# Patient Record
Sex: MALE | Race: WHITE | Employment: OTHER | URBAN - NONMETROPOLITAN AREA
[De-identification: names, ages, dates, MRNs, and addresses within clinical notes are randomized per-mention and may not be internally consistent; named-entity substitution may affect disease eponyms.]

---

## 2020-07-01 ENCOUNTER — HOSPITAL ENCOUNTER (EMERGENCY)
Age: 65
Discharge: HOME | End: 2020-07-01
Attending: EMERGENCY MEDICINE
Payer: MEDICARE

## 2020-07-01 ENCOUNTER — APPOINTMENT (OUTPATIENT)
Dept: GENERAL RADIOLOGY | Age: 65
End: 2020-07-01
Payer: MEDICARE

## 2020-07-01 VITALS
DIASTOLIC BLOOD PRESSURE: 83 MMHG | RESPIRATION RATE: 18 BRPM | TEMPERATURE: 98.4 F | OXYGEN SATURATION: 96 % | SYSTOLIC BLOOD PRESSURE: 145 MMHG

## 2020-07-01 PROCEDURE — 96372 THER/PROPH/DIAG INJ SC/IM: CPT

## 2020-07-01 PROCEDURE — 6360000002 HC RX W HCPCS: Performed by: EMERGENCY MEDICINE

## 2020-07-01 PROCEDURE — 73110 X-RAY EXAM OF WRIST: CPT

## 2020-07-01 PROCEDURE — 71101 X-RAY EXAM UNILAT RIBS/CHEST: CPT

## 2020-07-01 PROCEDURE — 99283 EMERGENCY DEPT VISIT LOW MDM: CPT

## 2020-07-01 PROCEDURE — 6370000000 HC RX 637 (ALT 250 FOR IP): Performed by: EMERGENCY MEDICINE

## 2020-07-01 RX ORDER — HYDROCODONE BITARTRATE AND ACETAMINOPHEN 5; 325 MG/1; MG/1
1 TABLET ORAL EVERY 6 HOURS PRN
Qty: 12 TABLET | Refills: 0 | Status: SHIPPED | OUTPATIENT
Start: 2020-07-01 | End: 2022-04-04

## 2020-07-01 RX ORDER — KETOROLAC TROMETHAMINE 15 MG/ML
30 INJECTION, SOLUTION INTRAMUSCULAR; INTRAVENOUS ONCE
Status: COMPLETED | OUTPATIENT
Start: 2020-07-01 | End: 2020-07-01

## 2020-07-01 RX ORDER — METHOCARBAMOL 500 MG/1
500 TABLET, FILM COATED ORAL 4 TIMES DAILY PRN
Qty: 28 TABLET | Refills: 0 | Status: SHIPPED | OUTPATIENT
Start: 2020-07-01 | End: 2022-04-04

## 2020-07-01 RX ADMIN — KETOROLAC TROMETHAMINE 30 MG: 15 INJECTION, SOLUTION INTRAMUSCULAR; INTRAVENOUS at 21:48

## 2020-07-01 ASSESSMENT — ENCOUNTER SYMPTOMS
SHORTNESS OF BREATH: 0
ABDOMINAL PAIN: 0
COLOR CHANGE: 0
VOMITING: 0
COUGH: 0
NAUSEA: 0

## 2020-07-01 ASSESSMENT — PAIN DESCRIPTION - FREQUENCY: FREQUENCY: CONTINUOUS

## 2020-07-01 ASSESSMENT — PAIN DESCRIPTION - PAIN TYPE
TYPE: ACUTE PAIN
TYPE: ACUTE PAIN

## 2020-07-01 ASSESSMENT — PAIN SCALES - GENERAL
PAINLEVEL_OUTOF10: 7
PAINLEVEL_OUTOF10: 5

## 2020-07-01 ASSESSMENT — PAIN DESCRIPTION - ORIENTATION: ORIENTATION: LEFT

## 2020-07-01 ASSESSMENT — PAIN DESCRIPTION - LOCATION: LOCATION: RIB CAGE;WRIST

## 2020-07-01 ASSESSMENT — PAIN DESCRIPTION - DESCRIPTORS: DESCRIPTORS: SHARP

## 2020-07-02 NOTE — ED PROVIDER NOTES
[facility]  eMERGENCY dEPARTMENT eNCOUnter      Pt Name: Michael Verdugo  MRN: [id number]  [place] 1955  Date of evaluation: 7/1/2020  Provider: Jillian [address]       Chief Complaint   Patient presents with    Wrist Pain     left wrist fell on arm,wrist area, rating a 7 on 0-10 scale    Rib Pain     fell onto left side         HISTORY OF PRESENT ILLNESS   (Location/Symptom, Timing/Onset, Context/Setting, Quality, Duration, Modifying Factors, Severity) Note limiting factors. HPI    Michael Verdugo is a 71 y.o. male who presents to the emergency department with complaint of left wrist and left rib pain. Patient reports that this evening approximately 2 hours ago he was standing next to a horse when it began to \"ren\". He states he began running away but tripped and fell onto his left side striking his left chest wall and his left wrist into the ground. He denies striking his head or any loss of consciousness. He denies any blood thinner use. He states he is noticed some swelling and pain in his left wrist and left chest and has concern for possible wrist or rib fracture and therefore comes in for evaluation. Nursing Notes were reviewed. REVIEW OF SYSTEMS    (2+ for level 4; 10+ for level 5)   Review of Systems   Constitutional: Negative for chills and fever. Eyes: Negative for visual disturbance. Respiratory: Negative for cough and shortness of breath. Cardiovascular: Negative for chest pain. Gastrointestinal: Negative for abdominal pain, nausea and vomiting. Musculoskeletal:        Positive left wrist and chest wall/rib pain   Skin: Negative for color change, rash and wound. Neurological: Negative for dizziness, syncope, light-headedness and headaches. Hematological: Does not bruise/bleed easily. All other systems reviewed and are negative.       PAST MEDICAL HISTORY     Past Medical History:   Diagnosis Date    Anxiety     Depression     GERD (gastroesophageal reflux disease)     Hemorrhoid     Hernia     Hyperlipidemia     Hypertension     Irritable bowel syndrome     Obesity        SURGICAL HISTORY       Past Surgical History:   Procedure Laterality Date    COLONOSCOPY  01/10/2017    -polyp,diverticulosis,lipoma    HERNIA REPAIR  4979    Umbilical repair    TONSILLECTOMY         CURRENT MEDICATIONS       Discharge Medication List as of 7/1/2020 10:18 PM      CONTINUE these medications which have NOT CHANGED    Details   bisoprolol (ZEBETA) 5 MG tablet Take 1 tablet by mouth daily, Disp-90 tablet, R-3Normal      lisinopril (PRINIVIL;ZESTRIL) 5 MG tablet Take 1 tablet by mouth daily, Disp-30 tablet, R-5Normal      glimepiride (AMARYL) 1 MG tablet Take 1 tablet by mouth every morning, Disp-90 tablet, R-3Normal      atorvastatin (LIPITOR) 80 MG tablet Take 1 tablet by mouth daily, Disp-90 tablet, R-3Normal      fluvoxaMINE (LUVOX) 100 MG tablet Historical Med      !! fluticasone (FLONASE) 50 MCG/ACT nasal spray 2 sprays by Each Nostril route daily, Disp-1 Bottle, R-0Normal      metFORMIN (GLUCOPHAGE) 1000 MG tablet Take 1 tablet by mouth 2 times daily (with meals), Disp-180 tablet, R-3Normal      !! fluticasone (FLONASE) 50 MCG/ACT nasal spray 1 spray by Each Nostril route 2 times daily, Disp-1 Bottle, R-0Normal      ibuprofen (ADVIL;MOTRIN) 600 MG tablet Take 1 tablet by mouth 3 times daily (with meals) for 7 days, Disp-20 tablet, R-0      aspirin 81 MG tablet Take 81 mg by mouth daily. !! - Potential duplicate medications found. Please discuss with provider. ALLERGIES     Patient has no known allergies.     FAMILY HISTORY       Family History   Problem Relation Age of Onset    Depression Mother     Diabetes Mother     High Blood Pressure Mother     Mental Illness Mother     Cancer Father     Diabetes Father     Asthma Brother     Cancer Brother     Diabetes Brother         SOCIAL HISTORY       Social History Socioeconomic History    Marital status:      Spouse name: None    Number of children: None    Years of education: None    Highest education level: None   Occupational History    None   Social Needs    Financial resource strain: Not hard at all   Napoleon-Vanesa insecurity     Worry: Never true     Inability: Never true   [email or web address] needs     Medical: No     Non-medical: No   Tobacco Use    Smoking status: Former Smoker     Packs/day: 2.00     Years: 10.00     Pack years: 20.00     Types: Cigarettes     Last attempt to quit: 1984     Years since quittin.8    Smokeless tobacco: Never Used   Substance and Sexual Activity    Alcohol use: Yes     Frequency: Monthly or less     Drinks per session: 1 or 2     Binge frequency: Less than monthly    Drug use: No    Sexual activity: None   Lifestyle    Physical activity     Days per week: 3 days     Minutes per session: 20 min    Stress: Rather much   Relationships    Social connections     Talks on phone: Twice a week     Gets together: Once a week     Attends [employer] service: More than 4 times per year     Active member of club or organization: Yes     Attends meetings of clubs or organizations: More than 4 times per year     Relationship status:     Intimate partner violence     Fear of current or ex partner: No     Emotionally abused: No     Physically abused: No     Forced sexual activity: No   Other Topics Concern    None   Social History Narrative    None       SCREENINGS    [place] Coma Scale  Eye Opening: Spontaneous  Best Verbal Response: Oriented  Best Motor Response: Obeys commands  [place] Coma Scale Score: 15      PHYSICAL EXAM    (up to 7 for level 4, 8 or more for level 5)   @[email or web address]    Physical Exam  Vitals signs and nursing note reviewed. Constitutional:       General: He is not in acute distress. Appearance: Normal appearance. He is normal weight. He is not ill-appearing, toxic-appearing or diaphoretic. HENT:      Head: Normocephalic and atraumatic. Eyes:      General: No scleral icterus. Right eye: No discharge. Left eye: No discharge. Extraocular Movements: Extraocular movements intact. Conjunctiva/sclera: Conjunctivae normal.      Pupils: Pupils are equal, round, and reactive to light. Neck:      Musculoskeletal: Normal range of motion and neck supple. No neck rigidity or muscular tenderness. Cardiovascular:      Rate and Rhythm: Normal rate and regular rhythm. Pulses: Normal pulses. Heart sounds: Normal heart sounds. No murmur. No friction rub. No gallop. Pulmonary:      Effort: Pulmonary effort is normal. No respiratory distress. Breath sounds: Normal breath sounds. No wheezing or rhonchi. Musculoskeletal:         General: Swelling and tenderness present. No deformity or signs of injury. Comments: Left upper extremity is neurovascularly intact; AIN/PIN are intact and normal.  Active range of motion is decreased secondary to pain. No ligamentous laxity noted. There is soft tissue swelling along the dorsal aspect of the left wrist from the midportion towards the ulna. There is mild diffuse pain with palpation. No obvious bony deformity or joint effusion. No pain in anatomical snuffbox    Patient also has pain in his left anterior lateral chest wall over rib regions 4-6 without bony deformity or crepitance. Skin:     General: Skin is warm and dry. Capillary Refill: Capillary refill takes less than 2 seconds. Comments: Soft tissue swelling to the left wrist as documented above but no signs of abrasions or ecchymosis   Neurological:      General: No focal deficit present. Mental Status: He is alert and oriented to person, place, and time. Cranial Nerves: No cranial nerve deficit. Psychiatric:         Mood and Affect: Mood normal.         Behavior: Behavior normal.         Thought Content:  Thought content normal.         Judgment: Judgment normal.         DIAGNOSTIC RESULTS     EKG (Per Emergency Physician):     RADIOLOGY (Per Emergency Physician): Interpretation per the Radiologist below, if available at the time of this note:  Xr Ribs Left Include Chest (min 3 Views)    Result Date: 7/1/2020  EXAMINATION: 6 XRAY VIEWS OF THE LEFT RIBS WITH FRONTAL XRAY VIEW OF THE CHEST 7/1/2020 9:36 pm COMPARISON: 12/18/2016 HISTORY: ORDERING SYSTEM PROVIDED HISTORY: injury TECHNOLOGIST PROVIDED HISTORY: injury FINDINGS: The lungs are clear there is no infiltrate, effusion or pneumothorax noted be present. The cardiac silhouette appears unremarkable. The ribs appear intact with no evidence for fracture. Old healed fractures noted. There is no evidence for underlying pleural reaction. No acute cardiopulmonary process seen and no evidence for acute rib fracture. Xr Wrist Left (min 3 Views)    Result Date: 7/1/2020  EXAMINATION: 3 XRAY VIEWS OF THE LEFT WRIST 7/1/2020 9:36 pm COMPARISON: None. HISTORY: ORDERING SYSTEM PROVIDED HISTORY: injury TECHNOLOGIST PROVIDED HISTORY: injury FINDINGS: The carpal bones all appear intact. There is no evidence for fracture dislocation. The soft tissues are somewhat edematous. The joint spaces are well preserved. There is no evidence for foreign body. No fracture or dislocation seen. Soft tissue swelling. RECOMMENDATION: If there is persistent pain and swelling, repeat imaging can be performed in 7-10 days. ED BEDSIDE ULTRASOUND:   Performed by ED Physician - none    LABS:  Labs Reviewed - No data to display     All other labs were within normal range or not returned as of this dictation.     EMERGENCY DEPARTMENT COURSE and DIFFERENTIAL DIAGNOSIS/MDM:   Vitals:    Vitals:    07/01/20 2100   BP: (!) 145/83   Resp: 18   Temp: 98.4 °F (36.9 °C)   SpO2: 96%       Medications   hydrocodone-acetaminophen (NORCO) tablet 5-325 mg (STARTER PACK) ( Oral Not Given 7/1/20 2237)   ketorolac (TORADOL) injection 30 mg (30 mg Intramuscular Given 7/1/20 4640)       MDM. Patient had a mechanical injury so I felt no need for a cardiac or syncope work-up. Moreover he did not strike his head or have loss of consciousness or blood thinner use. X-rays were obtained of the left wrist and chest wall/ribs concerning for fracture. X-rays are negative indicating patient has a left rib contusion and left wrist sprain. Therefore at this time he can be placed in a wrist brace to speed healing and discharged home with pain medication. REVAL:         CRITICAL CARE TIME   Total Critical Care time was minutes, excluding separately reportable procedures. There was a high probability of clinically significant/life threatening deterioration in the patient's condition which required my urgent intervention. CONSULTS:  None    PROCEDURES:  Unless otherwise noted below, none     Procedures    FINAL IMPRESSION      1. Rib contusion, left, initial encounter    2. Left wrist sprain, initial encounter          DISPOSITION/PLAN   DISPOSITION Decision To Discharge 07/01/2020 10:16:56 PM      PATIENT REFERRED TO:  Stephanie French MD  [place] [address], [address]  [phone]    Schedule an appointment as soon as possible for a visit in 1 week  If symptoms worsen      DISCHARGE MEDICATIONS:  Discharge Medication List as of 7/1/2020 10:18 PM      START taking these medications    Details   HYDROcodone-acetaminophen (NORCO) 5-325 MG per tablet Take 1 tablet by mouth every 6 hours as needed for Pain for up to 3 days. Intended supply: 3 days.  Take lowest dose possible to manage pain, Disp-12 tablet, R-0Print      methocarbamol (ROBAXIN) 500 MG tablet Take 1 tablet by mouth 4 times daily as needed (Muscle pain/spasm), Disp-28 tablet, R-0Print                (Please note:  Portions of this note were completed with a voice recognition program.  Efforts were made to edit the dictations but occasionally words and phrases are mis-transcribed.)  Form v2016. [id number]    Maru Gonzalez DO (electronically signed)  Emergency Medicine Provider       DO Jaz  07/01/20 2595

## 2022-05-16 ENCOUNTER — TELEPHONE (OUTPATIENT)
Dept: SURGERY | Age: 67
End: 2022-05-16

## 2022-05-23 ENCOUNTER — HOSPITAL ENCOUNTER (OUTPATIENT)
Dept: ULTRASOUND IMAGING | Age: 67
Discharge: HOME | End: 2022-05-25
Payer: MEDICARE

## 2022-05-23 DIAGNOSIS — Z13.6 SCREENING FOR AAA (ABDOMINAL AORTIC ANEURYSM): ICD-10-CM

## 2022-05-23 PROCEDURE — 76706 US ABDL AORTA SCREEN AAA: CPT

## 2022-07-05 ENCOUNTER — OFFICE VISIT (OUTPATIENT)
Dept: GASTROENTEROLOGY | Age: 67
End: 2022-07-05
Payer: MEDICARE

## 2022-07-05 ENCOUNTER — TELEPHONE (OUTPATIENT)
Dept: GASTROENTEROLOGY | Age: 67
End: 2022-07-05

## 2022-07-05 VITALS
HEART RATE: 61 BPM | RESPIRATION RATE: 16 BRPM | BODY MASS INDEX: 28.89 KG/M2 | HEIGHT: 75 IN | TEMPERATURE: 97.9 F | DIASTOLIC BLOOD PRESSURE: 81 MMHG | WEIGHT: 232.4 LBS | SYSTOLIC BLOOD PRESSURE: 132 MMHG

## 2022-07-05 DIAGNOSIS — Z01.818 PRE-OP TESTING: ICD-10-CM

## 2022-07-05 DIAGNOSIS — Z12.11 SCREENING FOR COLON CANCER: Primary | ICD-10-CM

## 2022-07-05 DIAGNOSIS — K62.5 RECTAL BLEEDING: ICD-10-CM

## 2022-07-05 PROCEDURE — 99213 OFFICE O/P EST LOW 20 MIN: CPT

## 2022-07-05 PROCEDURE — 99202 OFFICE O/P NEW SF 15 MIN: CPT | Performed by: INTERNAL MEDICINE

## 2022-07-05 PROCEDURE — 1123F ACP DISCUSS/DSCN MKR DOCD: CPT | Performed by: INTERNAL MEDICINE

## 2022-07-05 RX ORDER — POLYETHYLENE GLYCOL 3350, SODIUM CHLORIDE, POTASSIUM CHLORIDE, SODIUM BICARBONATE, AND SODIUM SULFATE 240; 5.84; 2.98; 6.72; 22.72 G/4L; G/4L; G/4L; G/4L; G/4L
4000 POWDER, FOR SOLUTION ORAL ONCE
Qty: 4000 ML | Refills: 0 | Status: SHIPPED | OUTPATIENT
Start: 2022-07-05 | End: 2022-07-05

## 2022-07-05 ASSESSMENT — ENCOUNTER SYMPTOMS
EYES NEGATIVE: 1
RESPIRATORY NEGATIVE: 1

## 2022-07-05 NOTE — PATIENT INSTRUCTIONS
SURVEY:    You may be receiving a survey from [employer] regarding your visit today. Please complete the survey to enable us to provide the highest quality of care to you and your family. If you cannot score us a very good on any question, please call the office to discuss how we could have made your experience a very good one. Thank you.

## 2022-07-05 NOTE — TELEPHONE ENCOUNTER
Patient saw in office today colonoscopy scheduled for 7/26/22 prep instructions handed to patient , surgery scheduling form faxed to surgery patient was informed of getting EKG done

## 2022-07-05 NOTE — PROGRESS NOTES
Chief Complaint   Patient presents with    New Patient     Screening for colon cancer; pt states has IBS         HPI  Mr. Ansley Rivas is a  78year old man with a history of depression, acid reflux, hypertension, hemorrhoids who presents for a colon cancer screening. He reports that he had a sigmoidoscopy in 1999 and that the 2017 procedure was his first full colonoscopy. He denies any dysphagia. He endorses some rectal bleeding in the past 6 weeks which led to his PCP recommended that he gets his colonoscopy performed with GI. He denies any weight loss. He denies any family history of colon cancer. He endorses a history of irritable bowel symptom, endorses occasional bloating with reflux - adding that he takes Prevacid occasionally. He reports that he had an EGD in 2013 and that there were no concerning findings. Colonoscopy 01/11/2017   Pan-colonic diverticulosis. Descending colon lipoma  Recto-sigmoid polyp. Pathology:   RECTAL SIGMOID POLYPECTOMY:   -MILDLY HYPERPLASTIC COLONIC MUCOSA (EARLY HYPERPLASTIC POLYP).      Past Medical History:   Diagnosis Date    Anxiety     COVID-19 virus infection 2021    Depression     GERD (gastroesophageal reflux disease)     Hemorrhoid     Hernia     Hyperlipidemia     Hypertension     Irritable bowel syndrome     Obesity          Past Surgical History:   Procedure Laterality Date    COLONOSCOPY  2017    -polyp,diverticulosis,lipoma    HERNIA REPAIR  7241    Umbilical repair    TONSILLECTOMY           Current Outpatient Medications   Medication Sig Dispense Refill    lisinopril (PRINIVIL;ZESTRIL) 5 MG tablet TAKE 1 TABLET EVERY DAY 90 tablet 1    atorvastatin (LIPITOR) 80 MG tablet Take 1 tablet by mouth daily 90 tablet 3    metFORMIN (GLUCOPHAGE) 1000 MG tablet Take 1 tablet by mouth 2 times daily (with meals) 180 tablet 3    glimepiride (AMARYL) 1 MG tablet Take 1 tablet by mouth every morning (before breakfast) 90 tablet 3    bisoprolol (ZEBETA) 5 MG tablet TAKE 1 TABLET EVERY DAY 90 tablet 3    fluvoxaMINE (LUVOX) 100 MG tablet 100 mg daily       aspirin 81 MG tablet Take 81 mg by mouth daily.  azelastine (ASTELIN) 0.1 % nasal spray 1 spray by Nasal route 2 times daily For 10 days only (Patient not taking: Reported on 7/5/2022) 30 mL 0    fluticasone (FLONASE) 50 MCG/ACT nasal spray 2 sprays by Each Nostril route daily (Patient not taking: Reported on 7/5/2022) 1 Bottle 0     No current facility-administered medications for this visit. Social Determinants of Health     Tobacco Use: Medium Risk    Smoking Tobacco Use: Former Smoker    Smokeless Tobacco Use: Never Used   Alcohol Use: Heavy Drinker    Frequency of Alcohol Consumption: 2-3 times a week    Average Number of Drinks: 3 or 4    Frequency of Binge Drinking: Monthly   Financial Resource Strain: Low Risk     Difficulty of Paying Living Expenses: Not hard at all   Food Insecurity:     Worried About Running Out of Food in the Last Year: Not on file    [place] of Food in the Last Year: Not on file   Transportation Needs: No Transportation Needs    Lack of Transportation (Medical): No    Lack of Transportation (Non-Medical): No   Physical Activity: Insufficiently Active    Days of Exercise per Week: 3 days    Minutes of Exercise per Session: 20 min   Stress: No Stress Concern Present    Feeling of Stress : Only a little   Social Connections: Moderately Integrated    Frequency of Communication with Friends and Family: Once a week    Frequency of Social Gatherings with Friends and Family: Once a week    Attends Anabaptist Services: More than 4 times per year    Active Member of [address] [place] [address] [employer] or Organizations:  Yes    Attends Club or Organization Meetings: 1 to 4 times per year    Marital Status:    Intimate Partner Violence: Not At Risk    Fear of Current or Ex-Partner: No    Emotionally Abused: No    Physically Abused: No    Sexually Abused: No   Depression: Not at risk  PHQ-2 Score: 0   Housing Stability:     Unable to Pay for Housing in the Last Year: Not on file    Number of Places Lived in the Last Year: Not on file    Unstable Housing in the Last Year: Not on file         /81 (Site: Left Upper Arm, Position: Sitting, Cuff Size: Medium Adult)   Pulse 61   Temp 97.9 °F (36.6 °C) (Temporal)   Resp 16   Ht 6' 3\" (1.905 m)   Wt 232 lb 6.4 oz (105.4 kg)   BMI 29.05 kg/m²     Physical Exam  Constitutional:       Appearance: Normal appearance. He is normal weight. HENT:      Head: Normocephalic. Nose: Nose normal.      Mouth/Throat:      Mouth: Mucous membranes are moist.      Pharynx: Oropharynx is clear. Comments: Mallampati score 2  Eyes:      General: No scleral icterus. Extraocular Movements: Extraocular movements intact. Pupils: Pupils are equal, round, and reactive to light. Cardiovascular:      Rate and Rhythm: Normal rate. Pulses: Normal pulses. Pulmonary:      Effort: Pulmonary effort is normal.      Breath sounds: Normal breath sounds. Abdominal:      General: Abdomen is flat. Bowel sounds are normal. There is no distension. Musculoskeletal:         General: Normal range of motion. Cervical back: Normal range of motion. Skin:     General: Skin is warm. Neurological:      General: No focal deficit present. Mental Status: He is alert. Psychiatric:         Mood and Affect: Mood normal.       Review of Systems   Constitutional: Negative. HENT: Negative. Eyes: Negative. Respiratory: Negative. Cardiovascular: Negative. Gastrointestinal:        IBS  GERD  Hemorrhoids   Genitourinary: Negative. Musculoskeletal: Negative. Skin: Negative. Neurological: Negative. Hematological: Negative. Psychiatric/Behavioral: The patient is nervous/anxious. Assessment   Mr. Tosha Ribera is a  78year old man with a history of depression, acid reflux, hypertension, hemorrhoids who presents for a colon cancer screening after he complained of a recurrence of rectal bleeding to his PCP. He is of average risk, Mallampati score 2, ASA 2. PLAN:  1. Screening for colon cancer  - COLONOSCOPY (Screening); Future  - polyethylene glycol-electrolytes (COLYTE) 240 g SOLR solution; Take 4,000 mLs by mouth once for 1 dose  Dispense: 4000 mL; Refill: 0    2. Rectal bleeding  - COLONOSCOPY (Screening); Future  - polyethylene glycol-electrolytes (COLYTE) 240 g SOLR solution; Take 4,000 mLs by mouth once for 1 dose  Dispense: 4000 mL; Refill: 0    3. Follow-up in 4-6 weeks or sooner as needed based on findings           Informed consent was obtained with a discussion about potential risks and complications of the procedure. Patient verbalized understanding and willingness to continue with the procedure scheduling. Spent 25 minutes with the patient with greater than 50 percent of the time was spent on face-to-face time in discussion with the patient regarding diagnostic options/results, treatment options, counseling, and follow-up plan.       Arielle Parker MD [facility]

## 2022-07-15 ENCOUNTER — HOSPITAL ENCOUNTER (OUTPATIENT)
Age: 67
Discharge: HOME | End: 2022-07-15
Payer: MEDICARE

## 2022-07-15 DIAGNOSIS — Z01.818 PRE-OP TESTING: ICD-10-CM

## 2022-07-15 DIAGNOSIS — Z12.11 SCREENING FOR COLON CANCER: ICD-10-CM

## 2022-07-15 PROBLEM — J01.90 ACUTE BACTERIAL SINUSITIS: Status: ACTIVE | Noted: 2022-07-15

## 2022-07-15 PROBLEM — B96.89 ACUTE BACTERIAL SINUSITIS: Status: ACTIVE | Noted: 2022-07-15

## 2022-07-15 PROCEDURE — 93005 ELECTROCARDIOGRAM TRACING: CPT

## 2022-07-16 LAB
EKG ATRIAL RATE: 67 BPM
EKG P AXIS: 5 DEGREES
EKG P-R INTERVAL: 198 MS
EKG Q-T INTERVAL: 422 MS
EKG QRS DURATION: 102 MS
EKG QTC CALCULATION (BAZETT): 445 MS
EKG R AXIS: -44 DEGREES
EKG T AXIS: 22 DEGREES
EKG VENTRICULAR RATE: 67 BPM

## 2022-07-16 PROCEDURE — 93010 ELECTROCARDIOGRAM REPORT: CPT | Performed by: FAMILY MEDICINE

## 2022-09-01 ENCOUNTER — ANESTHESIA EVENT (OUTPATIENT)
Dept: OPERATING ROOM | Age: 67
End: 2022-09-01
Payer: MEDICARE

## 2022-09-07 ENCOUNTER — ANESTHESIA (OUTPATIENT)
Dept: OPERATING ROOM | Age: 67
End: 2022-09-07
Payer: MEDICARE

## 2022-09-07 ENCOUNTER — HOSPITAL ENCOUNTER (OUTPATIENT)
Age: 67
Setting detail: OUTPATIENT SURGERY
Discharge: HOME | End: 2022-09-07
Attending: INTERNAL MEDICINE | Admitting: INTERNAL MEDICINE
Payer: MEDICARE

## 2022-09-07 VITALS
OXYGEN SATURATION: 95 % | TEMPERATURE: 97.8 F | SYSTOLIC BLOOD PRESSURE: 142 MMHG | HEIGHT: 75 IN | DIASTOLIC BLOOD PRESSURE: 74 MMHG | BODY MASS INDEX: 28.35 KG/M2 | WEIGHT: 228 LBS | HEART RATE: 60 BPM | RESPIRATION RATE: 16 BRPM

## 2022-09-07 DIAGNOSIS — K62.5 RECTAL BLEEDING: ICD-10-CM

## 2022-09-07 PROCEDURE — 7100000011 HC PHASE II RECOVERY - ADDTL 15 MIN: Performed by: INTERNAL MEDICINE

## 2022-09-07 PROCEDURE — 3700000000 HC ANESTHESIA ATTENDED CARE: Performed by: INTERNAL MEDICINE

## 2022-09-07 PROCEDURE — 2580000003 HC RX 258: Performed by: NURSE ANESTHETIST, CERTIFIED REGISTERED

## 2022-09-07 PROCEDURE — 45385 COLONOSCOPY W/LESION REMOVAL: CPT | Performed by: INTERNAL MEDICINE

## 2022-09-07 PROCEDURE — 6360000002 HC RX W HCPCS: Performed by: NURSE ANESTHETIST, CERTIFIED REGISTERED

## 2022-09-07 PROCEDURE — 45380 COLONOSCOPY AND BIOPSY: CPT | Performed by: INTERNAL MEDICINE

## 2022-09-07 PROCEDURE — 7100000010 HC PHASE II RECOVERY - FIRST 15 MIN: Performed by: INTERNAL MEDICINE

## 2022-09-07 PROCEDURE — 3700000001 HC ADD 15 MINUTES (ANESTHESIA): Performed by: INTERNAL MEDICINE

## 2022-09-07 PROCEDURE — 3609010600 HC COLONOSCOPY POLYPECTOMY SNARE/COLD BIOPSY: Performed by: INTERNAL MEDICINE

## 2022-09-07 PROCEDURE — 88305 TISSUE EXAM BY PATHOLOGIST: CPT

## 2022-09-07 PROCEDURE — 2709999900 HC NON-CHARGEABLE SUPPLY: Performed by: INTERNAL MEDICINE

## 2022-09-07 PROCEDURE — 2500000003 HC RX 250 WO HCPCS: Performed by: NURSE ANESTHETIST, CERTIFIED REGISTERED

## 2022-09-07 RX ORDER — SODIUM CHLORIDE, SODIUM LACTATE, POTASSIUM CHLORIDE, CALCIUM CHLORIDE 600; 310; 30; 20 MG/100ML; MG/100ML; MG/100ML; MG/100ML
INJECTION, SOLUTION INTRAVENOUS CONTINUOUS
Status: DISCONTINUED | OUTPATIENT
Start: 2022-09-07 | End: 2022-09-07 | Stop reason: HOSPADM

## 2022-09-07 RX ORDER — OXYMETAZOLINE HYDROCHLORIDE 0.05 G/100ML
1 SPRAY NASAL 2 TIMES DAILY
COMMUNITY

## 2022-09-07 RX ORDER — PROPOFOL 10 MG/ML
INJECTION, EMULSION INTRAVENOUS CONTINUOUS PRN
Status: DISCONTINUED | OUTPATIENT
Start: 2022-09-07 | End: 2022-09-07 | Stop reason: SDUPTHER

## 2022-09-07 RX ADMIN — LIDOCAINE HYDROCHLORIDE 60 MG: 20 INJECTION, SOLUTION EPIDURAL; INFILTRATION; INTRACAUDAL at 08:23

## 2022-09-07 RX ADMIN — SODIUM CHLORIDE, POTASSIUM CHLORIDE, SODIUM LACTATE AND CALCIUM CHLORIDE: 600; 310; 30; 20 INJECTION, SOLUTION INTRAVENOUS at 07:45

## 2022-09-07 RX ADMIN — PROPOFOL 80 MG: 10 INJECTION, EMULSION INTRAVENOUS at 08:23

## 2022-09-07 ASSESSMENT — PAIN SCALES - GENERAL: PAINLEVEL_OUTOF10: 0

## 2022-09-07 NOTE — H&P
History and Physical    Patient's Name/Date of Birth: Albertoisadora Null / 1955 (86 y.o.)    MRN: [id number]     Date: September 7, 2022       CHIEF COMPLAINT:  screening for colon cancer     HPI  Mr. Tosha Ribera is a  78year old man with a history of depression, acid reflux, hypertension, hemorrhoids who presents for a colon cancer screening. He reports that he had a sigmoidoscopy in 1999 and that the 2017 procedure was his first full colonoscopy. He denies any dysphagia. He endorses some rectal bleeding in the past 6 weeks which led to his PCP recommended that he gets his colonoscopy performed with GI. He denies any weight loss. He denies any family history of colon cancer. He endorses a history of irritable bowel symptom, endorses occasional bloating with reflux - adding that he takes Prevacid occasionally. He reports that he had an EGD in 2013 and that there were no concerning findings. Colonoscopy 01/11/2017   Pan-colonic diverticulosis. Descending colon lipoma  Recto-sigmoid polyp. Pathology:   RECTAL SIGMOID POLYPECTOMY:   -MILDLY HYPERPLASTIC COLONIC MUCOSA (EARLY HYPERPLASTIC POLYP). Past Medical History:   Diagnosis Date    Anxiety     COVID-19 virus infection 2021    Depression     GERD (gastroesophageal reflux disease)     Hemorrhoid     Hernia     Hyperlipidemia     Hypertension     Irritable bowel syndrome     Obesity      Past Surgical History:   Procedure Laterality Date    COLONOSCOPY  2017    -polyp,diverticulosis,lipoma    HERNIA REPAIR  1162    Umbilical repair    TONSILLECTOMY       Current Facility-Administered Medications   Medication Dose Route Frequency Provider Last Rate Last Admin    lactated ringers infusion   IntraVENous Continuous Ally Clementsiliisadora Null, APRN - CRNA         No Known Allergies  Family History   Problem Relation Age of Onset    Depression Mother     Diabetes Mother     High Blood Pressure Mother     Mental Illness Mother     Cancer Father     Diabetes Father Other Father     Asthma Brother     Cancer Brother     Diabetes Brother      Social History     Socioeconomic History    Marital status:      Spouse name: Not on file    Number of children: Not on file    Years of education: Not on file    Highest education level: Not on file   Occupational History    Not on file   Tobacco Use    Smoking status: Former     Packs/day: 2.00     Years: 10.00     Pack years: 20.00     Types: Cigarettes     Quit date: 1984     Years since quittin.0    Smokeless tobacco: Never   Vaping Use    Vaping Use: Never used   Substance and Sexual Activity    Alcohol use: Yes    Drug use: No    Sexual activity: Not on file   Other Topics Concern    Not on file   Social History Narrative    Not on file     Social Determinants of Health     Financial Resource Strain: Low Risk     Difficulty of Paying Living Expenses: Not hard at all   Food Insecurity: No Food Insecurity    Worried About [address] [employer] in the Last Year: Never true    [facility] in the Last Year: Never true   Transportation Needs: No Transportation Needs    Lack of Transportation (Medical): No    Lack of Transportation (Non-Medical): No   Physical Activity: Insufficiently Active    Days of Exercise per Week: 3 days    Minutes of Exercise per Session: 20 min   Stress: No Stress Concern Present    Feeling of Stress : Only a little   Social Connections:  Moderately Integrated    Frequency of Communication with Friends and Family: Once a week    Frequency of Social Gatherings with Friends and Family: Once a week    Attends Restorationism Services: More than 4 times per year    Active Member of [address] [place] [address] or Organizations: Yes    Attends Club or Organization Meetings: 1 to 4 times per year    Marital Status:    Intimate Partner Violence: Not At Risk    Fear of Current or Ex-Partner: No    Emotionally Abused: No    Physically Abused: No    Sexually Abused: No   Housing Stability: Not on file     ROS:

## 2022-09-07 NOTE — ANESTHESIA PRE PROCEDURE
Department of Anesthesiology  Preprocedure Note       Name:  Mandy Fonseca   Age:  78 y.o.  :  1955                                          MRN:  [id number]         Date:  2022      Surgeon: Faraz Richard):  Nisreen Ferrer MD    Procedure: Procedure(s):  COLORECTAL CANCER SCREENING, NOT HIGH RISK    Medications prior to admission:   Prior to Admission medications    Medication Sig Start Date End Date Taking? Authorizing Provider   oxymetazoline (AFRIN) 0.05 % nasal spray 1 spray by Nasal route 2 times daily   Yes Historical Provider, MD   glimepiride (AMARYL) 2 MG tablet Take 1 tablet by mouth in the morning and 1 tablet before bedtime. 8/10/22   Latesha White MD   lisinopril (PRINIVIL;ZESTRIL) 5 MG tablet TAKE 1 TABLET EVERY DAY 22   Latesha White MD   atorvastatin (LIPITOR) 80 MG tablet Take 1 tablet by mouth daily 1/3/22   Latesha White MD   metFORMIN (GLUCOPHAGE) 1000 MG tablet Take 1 tablet by mouth 2 times daily (with meals) 1/3/22   Latesha White MD   bisoprolol (ZEBETA) 5 MG tablet TAKE 1 TABLET EVERY DAY 21   Kaire Mcgraw, APRN - CNP   fluvoxaMINE (LUVOX) 100 MG tablet 100 mg daily  19   Historical Provider, MD   aspirin 81 MG tablet Take 81 mg by mouth daily. Historical Provider, MD       Current medications:    Current Facility-Administered Medications   Medication Dose Route Frequency Provider Last Rate Last Admin    lactated ringers infusion   IntraVENous Continuous Ally [place] Banister - CRNA 100 mL/hr at 22 0745 New Bag at 22 0745       Allergies:  No Known Allergies    Problem List:    Patient Active Problem List   Diagnosis Code    Type 2 diabetes mellitus without complication, without long-term current use of insulin ([facility] [address] E11.9    Mixed hyperlipidemia E78.2    Essential hypertension, benign I10    Colon polyp K63.5    Diverticulosis of large intestine without hemorrhage K57.30    Acute bacterial sinusitis J01.90, B96.89 Past Medical History:        Diagnosis Date    Anxiety     COVID-19 virus infection     Depression     GERD (gastroesophageal reflux disease)     Hemorrhoid     Hernia     Hyperlipidemia     Hypertension     Irritable bowel syndrome     Obesity        Past Surgical History:        Procedure Laterality Date    COLONOSCOPY  2017    -polyp,diverticulosis,lipoma    HERNIA REPAIR  08    Umbilical repair    TONSILLECTOMY         Social History:    Social History     Tobacco Use    Smoking status: Former     Packs/day: 2.00     Years: 10.00     Pack years: 20.00     Types: Cigarettes     Quit date: 1984     Years since quittin.0    Smokeless tobacco: Never   Substance Use Topics    Alcohol use: Yes                                Counseling given: Not Answered      Vital Signs (Current):   Vitals:    22 1519 22 0730   BP:  (!) 169/97   Pulse:  73   Resp:  23   Temp:  36.7 °C (98.1 °F)   TempSrc:  Temporal   SpO2:  98%   Weight: 232 lb (105.2 kg) 228 lb (103.4 kg)   Height: 6' 3\" (1.905 m) 6' 3\" (1.905 m)                                              BP Readings from Last 3 Encounters:   22 (!) 169/97   07/15/22 128/70   22 132/81       NPO Status: Time of last liquid consumption: 230                        Time of last solid consumption:                         Date of last liquid consumption: 22                        Date of last solid food consumption: 22    BMI:   Wt Readings from Last 3 Encounters:   22 228 lb (103.4 kg)   07/15/22 232 lb (105.2 kg)   22 232 lb 6.4 oz (105.4 kg)     Body mass index is 28.5 kg/m².     CBC: No results found for: WBC, RBC, HGB, HCT, MCV, RDW, PLT    CMP:   Lab Results   Component Value Date/Time     2021 09:47 AM    K 4.4 2021 09:47 AM     2021 09:47 AM    CO2 29 2021 09:47 AM    BUN 12 2021 09:47 AM    CREATININE 0.91 2021 09:47 AM    GFRAA >60 03/23/2015 01:01 PM    LABGLOM >60 03/23/2015 01:01 PM    GLUCOSE 108 09/23/2021 09:47 AM    PROT 7.3 03/23/2015 01:01 PM    CALCIUM 9.5 09/23/2021 09:47 AM    BILITOT 0.65 03/23/2015 01:01 PM    ALKPHOS 71 03/23/2015 01:01 PM    AST 18 03/23/2015 01:01 PM    ALT 27 03/23/2015 01:01 PM       POC Tests: No results for input(s): POCGLU, POCNA, POCK, POCCL, POCBUN, POCHEMO, POCHCT in the last 72 hours. Coags: No results found for: PROTIME, INR, APTT    HCG (If Applicable): No results found for: PREGTESTUR, PREGSERUM, HCG, HCGQUANT     ABGs: No results found for: PHART, PO2ART, UBY7RYO, QSP0FVV, BEART, K1HHGNHV     Type & Screen (If Applicable):  No results found for: LABABO, LABRH    Drug/Infectious Status (If Applicable):  Lab Results   Component Value Date/Time    HEPCAB NEGATIVE 12/12/2017 07:35 AM       COVID-19 Screening (If Applicable):   Lab Results   Component Value Date/Time    COVID19 Negative 03/29/2021 09:30 AM           Anesthesia Evaluation   no history of anesthetic complications:   Airway: Mallampati: IV  TM distance: >3 FB   Neck ROM: full  Mouth opening: > = 3 FB   Dental: normal exam         Pulmonary:Negative Pulmonary ROS and normal exam  breath sounds clear to auscultation                            ROS comment: Chronic sinusitis   Cardiovascular:  Exercise tolerance: good (>4 METS),   (+) hypertension:, hyperlipidemia      ECG reviewed                        Neuro/Psych:   (+) psychiatric history:            GI/Hepatic/Renal:   (+) GERD: well controlled, bowel prep,           Endo/Other:    (+) Diabetes (A1C 8.8)Type II DM, poorly controlled, , .                 Abdominal:             Vascular: negative vascular ROS. Other Findings:           Anesthesia Plan      general and TIVA     ASA 3       Induction: intravenous. Anesthetic plan and risks discussed with patient.                         IRMA Norton - LEIGH   9/7/2022

## 2022-09-07 NOTE — PROGRESS NOTES

## 2022-09-07 NOTE — ANESTHESIA POSTPROCEDURE EVALUATION
Department of Anesthesiology  Postprocedure Note    Patient: Mackenzie Monge  MRN: [id number]  YOB: 1955  Date of evaluation: 9/7/2022      Procedure Summary     Date: 09/07/22 Room / Location: [address] [place] [address]    Anesthesia Start: Katy Byrd Anesthesia Stop: 1491    Procedure: COLONOSCOPY POLYPECTOMY SNARE/COLD BIOPSY Diagnosis:       Rectal bleeding      (RECTAL BLEEDING)    Surgeons: Maple Kanner, MD Responsible Provider: [place]. Mackenzie MongeIRMA - CRNA    Anesthesia Type: general, TIVA ASA Status: 3          Anesthesia Type: No value filed.     Simona Phase I: Simona Score: 10    Simona Phase II: Simona Score: 10      Anesthesia Post Evaluation    Patient location during evaluation: PACU  Patient participation: complete - patient participated  Level of consciousness: awake and alert  Pain score: 0  Airway patency: patent  Nausea & Vomiting: no vomiting and no nausea  Complications: no  Cardiovascular status: blood pressure returned to baseline  Respiratory status: acceptable  Hydration status: stable

## 2022-09-07 NOTE — OP NOTE
[facility]    COLONOSCOPY    PROCEDURE DATE: 09/07/22    REFERRING PHYSICIAN: No ref. provider found     PRIMARY CARE PROVIDER: Maria Fernanda Padilla MD    ATTENDING PHYSICIAN: Cynthia Cruz MD     HISTORY: Mr. Gladis Colon is a 78 y.o. male who presents to the  endoscopy unit for colonoscopy. The patient's clinical history is remarkable for diabetes mellitus II, anxiety, hypertension. He is currently medically stable and appropriate for the planned procedure. PREOPERATIVE DIAGNOSIS: screening for colon cancer. PROCEDURES:   Transanal Colonoscopy --screening    POSTPROCEDURE DIAGNOSIS:  Colon polyps  Diverticulosis    MEDICATIONS: MAC per anesthesia     EBL: 2ml      INSTRUMENT: Olympus [id number] Pediatric flexible Colonoscope. PREPARATION: The nature and character of the procedure as well as risks, benefits, and alternatives were discussed with the patient and informed consent was obtained. Complications were said to include, but were not limited to: medication allergy, medication reaction, cardiovascular and respiratory problems, bleeding, perforation, infection, and/or missed diagnosis. Following arrival in the endoscopy room, the patient was placed in the left lateral decubitus position and final time-out accomplished in the presence of the nursing staff. Baseline vital signs were obtained and reviewed, and IV sedation was subsequently initiated. FINDINGS: Rectal examination demonstrated no significant visible external abnormality and digital palpation was unremarkable. Following adequate conscious sedation the colonoscope was introduced and advanced under direct visualization to the terminal ileum. The bowel preparation was felt to be fair - improved with irrigation and suction of fecal debris. Cecal intubation time was 6 minutes. Once maximally inserted, the endoscope was withdrawn and the mucosa was carefully inspected.  The mucosal exam revealed severe sigmoid diverticulosis and moderate pan colon diverticulosis. Sessile polyps removed:  5mm splenic flexure sessile polyp removed with cold biopsy forceps, 7mm sessile polyp removed from sigmoid colon with a cold snare. 1.4cm sessile polyp removed from rectum with a hot snare. Retroflexion was performed in the rectum and mild internal hemorrhoids were noted. Withdrawal time was 16 minutes. IMPRESSION:   Colon polyps  Diverticulosis  Tortuous colon    RECOMMENDATIONS:   1) Follow up with referring provider, as previously scheduled. 2) Repeat Colonoscopy in no later than 3 years  3) Await pathology report       Electronically signed by Stephany Eaton MD on 9/7/2022 at 8:54 AM     The patient was counseled at length about the risks of etelvina Covid-19 during their perioperative period and any recovery window from their procedure. The patient was made aware that etelvina Covid-19  may worsen their prognosis for recovering from their procedure  and lend to a higher morbidity and/or mortality risk. All material risks, benefits, and reasonable alternatives including postponing the procedure were discussed. The patient DOES wish to proceed with the procedure at this time.

## 2022-09-08 LAB — SURGICAL PATHOLOGY REPORT: NORMAL

## (undated) DEVICE — FORCEPS BX L240CM JAW DIA2.4MM ORNG L CAP W/ NDL DISP RAD

## (undated) DEVICE — ACUSNARE POLYPECTOMY SNARE: Brand: ACUSNARE

## (undated) DEVICE — CANNULA ORAL NSL AD CO2 N INTUB O2 DEL DISP TRU LNK

## (undated) DEVICE — TUBING SUCT NON-STRL 9/32X100 W/CNNT

## (undated) DEVICE — SOLUTION IV IRRIG POUR BRL 0.9% SODIUM CHL 2F7124